# Patient Record
Sex: MALE | Race: WHITE
[De-identification: names, ages, dates, MRNs, and addresses within clinical notes are randomized per-mention and may not be internally consistent; named-entity substitution may affect disease eponyms.]

---

## 2020-03-30 ENCOUNTER — HOSPITAL ENCOUNTER (OUTPATIENT)
Dept: HOSPITAL 35 - SJCVC | Age: 65
End: 2020-03-30
Attending: INTERNAL MEDICINE
Payer: COMMERCIAL

## 2020-03-30 DIAGNOSIS — Z87.891: ICD-10-CM

## 2020-03-30 DIAGNOSIS — B19.20: ICD-10-CM

## 2020-03-30 DIAGNOSIS — Z99.89: ICD-10-CM

## 2020-03-30 DIAGNOSIS — Z79.899: ICD-10-CM

## 2020-03-30 DIAGNOSIS — G47.33: ICD-10-CM

## 2020-03-30 DIAGNOSIS — I10: ICD-10-CM

## 2020-03-30 DIAGNOSIS — I45.10: Primary | ICD-10-CM

## 2020-03-30 DIAGNOSIS — R94.31: ICD-10-CM

## 2020-03-30 DIAGNOSIS — E78.5: ICD-10-CM

## 2020-07-20 ENCOUNTER — HOSPITAL ENCOUNTER (OUTPATIENT)
Dept: HOSPITAL 35 - LAB | Age: 65
End: 2020-07-20
Attending: INTERNAL MEDICINE
Payer: COMMERCIAL

## 2020-07-20 DIAGNOSIS — Z01.812: Primary | ICD-10-CM

## 2020-07-20 DIAGNOSIS — Z11.59: ICD-10-CM

## 2020-07-23 ENCOUNTER — HOSPITAL ENCOUNTER (OUTPATIENT)
Dept: HOSPITAL 35 - SLEEPLAB | Age: 65
End: 2020-07-23
Attending: INTERNAL MEDICINE
Payer: COMMERCIAL

## 2020-07-23 DIAGNOSIS — G47.33: Primary | ICD-10-CM

## 2020-07-23 DIAGNOSIS — Z99.89: ICD-10-CM

## 2020-07-26 NOTE — SLE
[facility]
Vishal [place]   [address]                     POLYSOMNOGRAPHY STUDY         
_______________________________________________________________________________
 
Name:       TRENTONSHANEKA SUDHA              Room #:                     REG [facility]#:      [id number]                       Account #:      [id number]  
Admission:  07/23/20    Attend Phys:    Brain Alcazar MD      
Discharge:              Date of Birth:  [birth year]  
                                                          Report #: [id number]
                                                                    [id number]   
_______________________________________________________________________________
THIS REPORT FOR:   //name//                          
 
CC: Brain Christensen MD
 
DATE OF SERVICE:  07/23/2020
 
 
SLEEP STUDY
 
REFERRING PHYSICIAN:  Dr. Mook Christensen.
 
The patient is 65 years old who weighs 152 pounds with a BMI of 23.8.  The
patient's [place] score was 6.  The patient has a history of sleep apnea for
which the patient has been on CPAP.  A recent nocturnal oximetry study while on
the CPAP was abnormal as a result the patient was referred back for another
titration study.
 
During the night study, the patient spent 411 minutes in bed and slept for 378
minutes with a sleep efficiency of 92%.  Sleep latency was 7 minutes with a REM
latency of 228 minutes.  Sleep architecture showed normal stage 1 sleep,
increased stage 2 sleep, absent slow wave and reduced REM sleep, which was only
7% of total sleep time.
 
EKG monitoring revealed an average heart rate of 60 beats per minute.  No
sustained arrhythmias observed.
 
PLMS were seen at an index of 10 per hour and 1.7 per hour caused EEG arousals.
 
The patient was started on CPAP at 6 cm of water and titrated up to 16 cm water.
 At the final pressure, the patient slept for 58 minutes.  The patient had 7
minutes of lateral REM sleep.  The patient's AHI was reduced to 0 per hour and
oxygen saturations remained above 93%.  The patient tolerated the CPAP well. 
The patient did have mask leak as he had beard, which resulted in difficulty in
maintaining a tight seal.
 
IMPRESSION:
1.  Sleep apnea diagnosed by previous sleep study.
2.  No significant periodic limb movements.
 
RECOMMENDATIONS:
1.  CPAP at 16 cm water completely eliminated the patient's sleep apnea and
should be used on a nightly basis.
2.  Follow up in 4-6 weeks to assess compliance with CPAP and to document
clinical improvement.
 
 
 
[facility]
[address] [place]   [address]                     POLYSOMNOGRAPHY STUDY         
_______________________________________________________________________________
 
Name:       SHANEKA CHOWDHURY              [facility]      [id number]                       Account #:      [id number]  
Admission:  07/23/20    Attend Phys:    Brain Alcazar MD      
Discharge:              Date of Birth:  [birth year]  
                                                          Report #: [id number]
                                                                    [id number]   
_______________________________________________________________________________
3.  Avoid CNS depressants.
4.  Cautioned regarding driving until symptoms of sleep apnea resolve with
current CPAP pressure.
 
 
 
 
 
 
 
 
 
 
 
 
 
 
 
 
 
 
 
 
 
 
 
 
 
 
 
 
 
 
 
 
 
 
 
 
 
 
 
 
 
  <ELECTRONICALLY SIGNED>
   By: Brain Alcazar MD              
  07/26/20     [id number]
D: 07/25/20 1741                           _____________________________________
T: 07/25/20 1753                           Brain Alcazar MD                /nt

## 2021-03-30 ENCOUNTER — HOSPITAL ENCOUNTER (OUTPATIENT)
Dept: HOSPITAL 35 - SJCVC | Age: 66
End: 2021-03-30
Attending: INTERNAL MEDICINE
Payer: COMMERCIAL

## 2021-03-30 DIAGNOSIS — K21.9: ICD-10-CM

## 2021-03-30 DIAGNOSIS — G47.33: ICD-10-CM

## 2021-03-30 DIAGNOSIS — I45.10: ICD-10-CM

## 2021-03-30 DIAGNOSIS — I44.0: Primary | ICD-10-CM

## 2021-03-30 DIAGNOSIS — E78.5: ICD-10-CM

## 2021-03-30 DIAGNOSIS — Z79.82: ICD-10-CM

## 2021-03-30 DIAGNOSIS — R00.1: ICD-10-CM

## 2021-03-30 DIAGNOSIS — R94.31: ICD-10-CM

## 2021-03-30 DIAGNOSIS — Z72.89: ICD-10-CM

## 2021-03-30 DIAGNOSIS — Z87.891: ICD-10-CM

## 2021-03-30 DIAGNOSIS — B19.20: ICD-10-CM

## 2021-03-30 DIAGNOSIS — I10: ICD-10-CM

## 2021-03-30 DIAGNOSIS — Z88.2: ICD-10-CM

## 2021-03-30 DIAGNOSIS — I25.10: ICD-10-CM

## 2021-03-30 DIAGNOSIS — Z79.899: ICD-10-CM

## 2021-03-30 DIAGNOSIS — R09.89: ICD-10-CM

## 2021-03-30 DIAGNOSIS — Z99.89: ICD-10-CM

## 2021-10-25 ENCOUNTER — HOSPITAL ENCOUNTER (OUTPATIENT)
Dept: HOSPITAL 35 - SJCVCIMAG | Age: 66
End: 2021-10-25
Attending: INTERNAL MEDICINE
Payer: COMMERCIAL

## 2021-10-25 DIAGNOSIS — I45.10: ICD-10-CM

## 2021-10-25 DIAGNOSIS — G47.33: ICD-10-CM

## 2021-10-25 DIAGNOSIS — R09.89: ICD-10-CM

## 2021-10-25 DIAGNOSIS — E78.5: ICD-10-CM

## 2021-10-25 DIAGNOSIS — I25.10: ICD-10-CM

## 2021-10-25 DIAGNOSIS — I65.23: Primary | ICD-10-CM

## 2021-10-25 DIAGNOSIS — E11.9: ICD-10-CM

## 2021-10-25 DIAGNOSIS — I10: ICD-10-CM

## 2021-10-25 DIAGNOSIS — B19.20: ICD-10-CM

## 2021-10-25 DIAGNOSIS — Z87.891: ICD-10-CM
